# Patient Record
Sex: MALE | Race: WHITE | Employment: FULL TIME | URBAN - METROPOLITAN AREA
[De-identification: names, ages, dates, MRNs, and addresses within clinical notes are randomized per-mention and may not be internally consistent; named-entity substitution may affect disease eponyms.]

---

## 2020-07-11 ENCOUNTER — APPOINTMENT (OUTPATIENT)
Dept: GENERAL RADIOLOGY | Age: 25
End: 2020-07-11
Attending: PHYSICIAN ASSISTANT
Payer: OTHER GOVERNMENT

## 2020-07-11 ENCOUNTER — HOSPITAL ENCOUNTER (EMERGENCY)
Age: 25
Discharge: HOME | End: 2020-07-11
Attending: EMERGENCY MEDICINE
Payer: OTHER GOVERNMENT

## 2020-07-11 VITALS
WEIGHT: 190 LBS | HEIGHT: 69 IN | TEMPERATURE: 99.1 F | HEART RATE: 83 BPM | RESPIRATION RATE: 14 BRPM | DIASTOLIC BLOOD PRESSURE: 82 MMHG | OXYGEN SATURATION: 98 % | SYSTOLIC BLOOD PRESSURE: 142 MMHG | BODY MASS INDEX: 28.14 KG/M2

## 2020-07-11 DIAGNOSIS — G89.29 CHRONIC BILATERAL LOW BACK PAIN WITHOUT SCIATICA: Primary | ICD-10-CM

## 2020-07-11 DIAGNOSIS — M54.50 CHRONIC BILATERAL LOW BACK PAIN WITHOUT SCIATICA: Primary | ICD-10-CM

## 2020-07-11 PROCEDURE — 72072 X-RAY EXAM THORAC SPINE 3VWS: CPT

## 2020-07-11 PROCEDURE — 99282 EMERGENCY DEPT VISIT SF MDM: CPT

## 2020-07-11 PROCEDURE — 72110 X-RAY EXAM L-2 SPINE 4/>VWS: CPT

## 2020-07-11 NOTE — ED PROVIDER NOTES
EMERGENCY DEPARTMENT HISTORY AND PHYSICAL EXAM    Date: 7/11/2020  Patient Name: Liberty Moreno    History of Presenting Illness     Chief Complaint   Patient presents with    Back Pain         History Provided By: Patient        Additional History (Context): Liberty Moreno is a 26 y.o. male with No significant past medical history who presents with a request for x-ray of the lumbar and thoracic spine status post work injury which occurred December of last year. Patient states he was in the [employer] and was dangling from a line with a immediate stop causing him to have low back pain intermittently since this injury. Patient states initial injury was worked up with radiologic studies without acute findings. Patient denies numbness, weakness or paresthesias of the lower extremities. Patient also denies bowel or bladder dysfunction. Patient comes to the ER today with an order from Dr. Prerna Cutler for T-spine and L-spine x-rays. PCP: None        Past History     Past Medical History:  No past medical history on file. Past Surgical History:  No past surgical history on file. Family History:  No family history on file. Social History:  Social History     Tobacco Use    Smoking status: Not on file   Substance Use Topics    Alcohol use: Not on file    Drug use: Not on file       Allergies:  No Known Allergies      Review of Systems   Review of Systems  Review of Systems   Constitutional: Negative for fatigue and fever. HENT: Negative for congestion. Respiratory: Negative for cough and shortness of breath. Cardiovascular: Negative for chest pain. Gastrointestinal: Negative for abdominal pain, diarrhea, nausea and vomiting. Genitourinary: Negative for difficulty urinating and dysuria. Musculoskeletal: Intermittent lumbar pain without radicular symptoms x7 months. Skin: Negative for wound. Neurological: Negative for dizziness and headaches. All other systems reviewed and are negative. All Other Systems Negative  Physical Exam     Vitals:    07/11/20 1602   BP: 142/82   Pulse: 83   Resp: 14   Temp: 99.1 °F (37.3 °C)   SpO2: 98%   Weight: 86.2 kg (190 lb)   Height: 5' 9\" (1.753 m)     Physical Exam     Constitutional: Pt is oriented to person, place, and time. Pt appears well-developed and well-nourished. No acute distress. HENT:   Head: Normocephalic and atraumatic. Mouth/Throat: Oropharynx is clear and moist.   Eyes: Pupils are equal, round, and reactive to light. Neck: Normal range of motion. Neck supple. No tracheal deviation present. No thyromegaly present. Cardiovascular: Normal rate, regular rhythm and normal heart sounds. No murmur heard. Pulmonary/Chest: Effort normal and breath sounds normal. No respiratory distress. No wheezes or rales. Musculoskeletal: Normal range of motion. No edema or deformity. Vertebral spine is not TTP, there is no spasm present. Strength is 5/5 and equal DTRs are intact. Neurological: Pt is alert and oriented to person, place, and time   Skin: Skin is warm and dry. Psychiatric: Pt has a normal mood and affect;  behavior is normal. Judgment and thought content normal.           Diagnostic Study Results     Labs -   No results found for this or any previous visit (from the past 12 hour(s)). Radiologic Studies -   XR SPINE LUMB MIN 4 V    (Results Pending)   XR SPINE THORAC 3 V    (Results Pending)     CT Results  (Last 48 hours)    None        CXR Results  (Last 48 hours)    None            Medical Decision Making   I am the first provider for this patient. I reviewed the vital signs, available nursing notes, past medical history, past surgical history, family history and social history. Vital Signs-Reviewed the patient's vital signs.        Comparison:    Records Reviewed: Nursing Notes, Old Medical Records and Previous Radiology Studies    Procedures:  Procedures    Provider Notes (Medical Decision Making):   X-rays ordered for patient per his request.  Patient is neurologically intact. No acute distress. Concerning findings on x-ray patient discharge. Patient will follow-up with Dr. Delonte Simon. MED RECONCILIATION:  No current facility-administered medications for this encounter. No current outpatient medications on file. Disposition:  Home    DISCHARGE NOTE:     Pt has been reexamined. Patient has no new complaints, changes, or physical findings. Care plan outlined and precautions discussed. Results of imaging and exam were reviewed with the patient. All medications were reviewed with the patient; will d/c home with follow up. All of pt's questions and concerns were addressed. Patient was instructed and agrees to follow up with primary care, as well as to return to the ED upon further deterioration. Patient is ready to go home. Follow-up Information     Follow up With Specialties Details Why Francisco Javier 50    [address]  [address] [address]  [phone] [address] [facility] EMERGENCY DEPT Emergency Medicine  If symptoms worsen [address] [facility]  [phone]          There are no discharge medications for this patient. Diagnosis     Clinical Impression:   1.  Chronic bilateral low back pain without sciatica

## 2020-07-11 NOTE — DISCHARGE INSTRUCTIONS
Patient Education        Learning About Relief for Back Pain  What is back tension and strain? Back strain happens when you overstretch, or pull, a muscle in your back. You may hurt your back in an accident or when you exercise or lift something. Most back pain will get better with rest and time. You can take care of yourself at home to help your back heal.  What can you do first to relieve back pain? When you first feel back pain, try these steps:  · Walk. Take a short walk (10 to 20 minutes) on a level surface (no slopes, hills, or stairs) every 2 to 3 hours. Walk only distances you can manage without pain, especially leg pain. · Relax. Find a comfortable position for rest. Some people are comfortable on the floor or a medium-firm bed with a small pillow under their head and another under their knees. Some people prefer to lie on their side with a pillow between their knees. Don't stay in one position for too long. · Try heat or ice. Try using a heating pad on a low or medium setting, or take a warm shower, for 15 to 20 minutes every 2 to 3 hours. Or you can buy single-use heat wraps that last up to 8 hours. You can also try an ice pack for 10 to 15 minutes every 2 to 3 hours. You can use an ice pack or a bag of frozen vegetables wrapped in a thin towel. There is not strong evidence that either heat or ice will help, but you can try them to see if they help. You may also want to try switching between heat and cold. · Take pain medicine exactly as directed. ¨ If the doctor gave you a prescription medicine for pain, take it as prescribed. ¨ If you are not taking a prescription pain medicine, ask your doctor if you can take an over-the-counter medicine. What else can you do? · Stretch and exercise. Exercises that increase flexibility may relieve your pain and make it easier for your muscles to keep your spine in a good, neutral position. And don't forget to keep walking. · Do self-massage.  You can use self-massage to unwind after work or school or to energize yourself in the morning. You can easily massage your feet, hands, or neck. Self-massage works best if you are in comfortable clothes and are sitting or lying in a comfortable position. Use oil or lotion to massage bare skin. · Reduce stress. Back pain can lead to a vicious Hoh: Distress about the pain tenses the muscles in your back, which in turn causes more pain. Learn how to relax your mind and your muscles to lower your stress. Where can you learn more? Go to http://betty-peggy.info/. Enter L062 in the search box to learn more about \"Learning About Relief for Back Pain. \"  Current as of: March 21, 2017  Content Version: 11.5  © 9818-8890 Healthwise, Incorporated. Care instructions adapted under license by [employer] (which disclaims liability or warranty for this information). If you have questions about a medical condition or this instruction, always ask your healthcare professional. [address] any warranty or liability for your use of this information.

## 2020-07-11 NOTE — ED TRIAGE NOTES
Patient has an order for back x-ray. Because it is on paper we won't take it. Patient c/o back pain.

## 2020-07-20 ENCOUNTER — HOSPITAL ENCOUNTER (OUTPATIENT)
Dept: PHYSICAL THERAPY | Age: 25
Discharge: HOME | End: 2020-07-20
Payer: OTHER GOVERNMENT

## 2020-07-20 PROCEDURE — 97530 THERAPEUTIC ACTIVITIES: CPT

## 2020-07-20 PROCEDURE — 97161 PT EVAL LOW COMPLEX 20 MIN: CPT

## 2020-07-20 NOTE — PROGRESS NOTES
PT DAILY TREATMENT NOTE/LUMBAR EVAL 10-18    Patient Name: Kate Nolasco  Date:2020  : 1995  [x]  Patient  Verified  Payor: CHOLO / Plan: Wali Goss 74 / Product Type:  /    In time: 4:18  Out time: 4:56  Total Treatment Time (min): 38  Visit #: 1 of 12    Medicare/BCBS Only   Total Timed Codes (min):  23 1:1 Treatment Time:  38     Treatment Area: Low back pain [M54.5]  SUBJECTIVE  Pain Level (0-10 scale): 4/10  []constant []intermittent []improving []worsening []no change since onset    Any medication changes, allergies to medications, adverse drug reactions, diagnosis change, or new procedure performed?: [x] No    [] Yes (see summary sheet for update)  Subjective functional status/changes:     PLOF:  ind with all mobility, perspecta, sit for a long time due to job duty  Limitations to PLOF:   Mechanism of Injury:   Current symptoms/Complaints: MR. Kate Nolasco is a 25 y/o, M pt with CC of LBP, radiating pain ant thigh and Left hip socket. Imaging done including X-ray for l/s, showing negative. About 6 months ago; pt landed hard on his feet dropping from a pull up bar; he felt the pain along l/s but had to cont his physical training including plank and 2 mile rum. Pain and back stiffness has been aggravated with lifting and sitting without back. Previous Treatment/Compliance:   PMHx/Surgical Hx:  Work Hx:   Living Situation:   Pt Goals: \"no back pain\"  Barriers: []pain []financial []time []transportation []other  Motivation:   Substance use: []Alcohol []Tobacco []other:   FABQ Score: []low []elevate  Cognition: A & O x     Other:    OBJECTIVE/EXAMINATION  Domestic Life:  Activity/Recreational Limitations:   Mobility:   Self Care:          Modality rationale: decrease pain and increase tissue extensibility to improve the patients ability to tolerate ADLs   Min Type Additional Details    [] Estim:  []Unatt       []IFC  []Premod                        []Other:  []w/ice []w/heat  Position:  Location:    [] Estim: []Att    []TENS instruct  []NMES                    []Other:  []w/US   []w/ice   []w/heat  Position:  Location:    []  Traction: [] Cervical       []Lumbar                       [] Prone          []Supine                       []Intermittent   []Continuous Lbs:  [] before manual  [] after manual    []  Ultrasound: []Continuous   [] Pulsed                           []1MHz   []3MHz Location:  W/cm2:    []  Iontophoresis with dexamethasone         Location: [] Take home patch   [] In clinic   10 during TA []  Ice     [x]  heat  []  Ice massage  []  Laser   []  Anodyne Position: seated  Location: back    []  Laser with stim  []  Other: Position:  Location:    []  Vasopneumatic Device Pressure:       [] lo [] med [] hi   Temperature: [] lo [] med [] hi   [x] Skin assessment post-treatment:  [x]intact []redness- no adverse reaction    []redness  adverse reaction:     15 min [x]Eval                  []Re-Eval       23 min Therapeutic Activity:  []  See flow sheet :Pt edu within scope of practice on prognosis, POC, l/s anatomy, posture, lifting mechanics, modalities use, STM/DTM with foam roll, HEP review   Rationale: increase ROM, increase strength, improve coordination, improve balance and increase proprioception  to improve the patients ability to perform ADLs and job duties with ease     With   [] TE   [] TA   [] neuro   [] other: Patient Education: [x] Review HEP    [] Progressed/Changed HEP based on:   [] positioning   [] body mechanics   [] transfers   [] heat/ice application    [] other:      Other Objective/Functional Measures:     Physical Therapy Evaluation - Lumbar Spine ([facility])    SUBJECTIVE  Chief Complaint:    Mechanism of injury:    Symptoms:  Aggravated by:   [] Bending [x] Sitting [x] Standing [] Walking   [] Moving [] Cough [] Sneeze [] Valsalva   [] AM  [] PM  Lying:  [] sup   [] pro   [] sidelying   [] Other:     Eased by:    [] Bending [] Sitting [] Standing [] Walking   [] Moving [] AM  [] PM  Lying: [] sup  [] pro  [] sidelying   [] Other:     General Health:  Red Flags Indicated? [] Yes    [] No  [] Yes [] No Recent weight change (If yes, due to dieting?  [] Yes  [] No)   [] Yes [] No Weakness in legs during walking  [] Yes [] No Unremitting pain at night  [] Yes [] No Abdominal pain or problems  [] Yes [] No Rectal bleeding  [] Yes [] No Feet more cold or painful in cold weather  [] Yes [] No Menstrual irregularities  [] Yes [] No Blood or pain with urination  [] Yes [] No Dysfunction of bowel or bladder  [] Yes [] No Recent illness within past 3 weeks (i.e, cold, flu)  [] Yes [x] No Numbness/tingling in buttock/genitalia region    Past History/Treatments:     Diagnostic Tests: [] Lab work [x] X-rays    [] CT [] MRI     [] Other:  Results: negative for both t/s and l/s    Functional Status  Prior level of function:  Present functional limitations:  What position do you sleep in?:    OBJECTIVE  Posture:  Lateral Shift: [] R    [] L     [] +  [] -  Kyphosis: [] Increased [] Decreased   []  WNL  Lordosis:  [] Increased [x] Decreased   [] WNL  Pelvic symmetry: [] WNL    [] Other:    Gait:  [] Normal     [] Abnormal:    Active Movements: [] N/A   [] Too acute   [x] Other: WNL, pain with flex and ext  ROM % AROM % PROM Comments:pain, area   Forward flexion 40-60      Extension 20-30      SB right 20-30      SB left 20-30      Rotation right 5-10      Rotation left 5-10        Repeated Movements   Effects on present pain: produces (IA), abolishes (A), increases (incr), decreases (decr), centralizes (C), peripheral (PH), no effect (NE)   Pre-Test Sx Flexion Repeated Flexion Extension Repeated Extension Repeated SBL Repeated SBR   Sitting          Standing   PH to Left side       Lying      N/A N/A   Comments:  Side Glide:  Sustained passive positioning test:  REJI: increased pain with back and radiating pain along B LEs    Neuro Screen [] WNL  Myotome/Dermatome/Reflexes:  Comments:    Dural Mobility:  SLR Sitting: [] R    [] L    [] +    [] -  @ (degrees):           Supine: [] R    [] L    [] +    [] -  @ (degrees):   Slump Test: [] R    [] L    [] +    [] -  @ (degrees):   Prone Knee Bend: [] R    [] L    [] +    [] -     Palpation  [] Min  [] Mod  [] Severe    Location:  [] Min  [] Mod  [] Severe    Location:  [] Min  [] Mod  [] Severe    Location:    Strength   L(0-5) R (0-5) N/T   Hip Flexion (L1,2) 4+ 4+ []   Knee Extension (L3,4) 5 5 []   Ankle Dorsiflexion (L4) 5 5 []   Great Toe Extension (L5) 5 5 []   Ankle Plantarflexion (S1) ~4 ~4 []   Knee Flexion (S1,2) 5 5 []   Upper Abdominals   []   Lower Abdominals   []   Paraspinals   []   Back Rotators   []   Gluteus Blaine 4 4 []   Gluteus Med 3 3 []     Special Tests  Lumbar:  Lumb. Compression: [] Pos  [] Neg               Lumbar Distraction:   [] Pos  [] Neg    Quadrant:  [] Pos  [] Neg   [] Flex  [] Ext    Sacroilliac:  Gaenslen's: [] R    [] L    [] +    [] -     Compression: [] +    [] -     Gapping:  [] +    [] -     Thigh Thrust: [] R    [] L    [] +    [] -     Leg Length: [] +    [] -   Position:    Crests:    ASIS:    PSIS:    Sacral Sulcus:    Mobility: Standing flex:     Sitting flex:     Supine to sit:     Prone knee bend:         Hip: So Nayely:  [] R    [] L    [] +    [] -     Scour:  [] R    [x] L    [] +    [x] -     Piriformis: [x] R    [x] L    [x] +    [] -          Deficits: Vj's: [] R    [] L    [] +    [] -     Mayco: [] R    [] L    [] +    [] -     Hamstrings 90/90: mod tightness B    Gastrocsoleus (to neutral): Right: Left:       Global Muscular Weakness:  Abdominals:  Quadratus Lumborum:  Paraspinals:   Other:    Other tests/comments:       Pain Level (0-10 scale) post treatment: 0/10    ASSESSMENT/Changes in Function: see POC    Patient will continue to benefit from skilled PT services to modify and progress therapeutic interventions, address functional mobility deficits, address ROM deficits, address strength deficits, analyze and address soft tissue restrictions, analyze and cue movement patterns, analyze and modify body mechanics/ergonomics, assess and modify postural abnormalities and instruct in home and community integration to attain remaining goals.      [x]  See Plan of Care  []  See progress note/recertification  []  See Discharge Summary         Progress towards goals / Updated goals:  See POC    PLAN  [x]  Upgrade activities as tolerated     [x]  Continue plan of care  []  Update interventions per flow sheet       []  Discharge due to:_  []  Other:_    Edgar Jones, PT 7/20/2020  4:19 PM

## 2020-07-20 NOTE — PROGRESS NOTES
[facility] [employer] [facility]  [address] [place] [address]  [phone] fax    Plan of Care/ Statement of Necessity for Physical Therapy Services    Patient name: Sheyla Kinney Start of Care: 2020   Referral source: Saud Chow MD : 1995    Medical Diagnosis: Low back pain [M54.5]  Payor:  / Plan: Wali Goss 74 / Product Type:  /  Onset Date:about 6 months ago    Treatment Diagnosis: Back pain, radicular pain with B ant thigh   Prior Hospitalization: see medical history Provider#: [id number]   Medications: Verified on Patient summary List    Comorbidities: alcohol and tobacco use   Prior Level of Function: ind with all mobility, perspecta, sit for a long time due to job duty     The [address] [place] [address] and following information is based on the information from the initial evaluation. Assessment/ watkins information: Davina Sheikh is a 25 y/o, M pt with CC of LBP, radiating pain ant thigh and Left hip socket. Imaging done including X-ray for l/s, showing negative. About 6 months ago; pt landed hard on his feet dropping from a pull up bar; he felt the pain along l/s but had to cont his physical training including plank and 2 mile rum. Pain and back stiffness has been aggravated with lifting and sitting without back. Pt present with Left rotated l/s, mod pain with compression, poor core strength and glute med strength. Mukesh Medley also noted poor flexibility of HS and hip ER. WNL with dermatomes and myotomes screening. Pt would benefit from skilled PT to address these deficits above for pain free functional mobility.     Evaluation Complexity History MEDIUM  Complexity : 1-2 comorbidities / personal factors will impact the outcome/ POC ; Examination MEDIUM Complexity : 3 Standardized tests and measures addressing body structure, function, activity limitation and / or participation in recreation  ;Presentation LOW Complexity : Stable, uncomplicated  ;Clinical Decision Making MEDIUM Complexity : FOTO score of 26-74  Overall Complexity Rating: LOW   Problem List: pain affecting function, decrease strength, edema affecting function, impaired gait/ balance, decrease ADL/ functional abilitiies, decrease activity tolerance, decrease flexibility/ joint mobility and decrease transfer abilities   Treatment Plan may include any combination of the following: Therapeutic exercise, Therapeutic activities, Neuromuscular re-education, Physical agent/modality, Gait/balance training, Manual therapy, Patient education, Self Care training, Functional mobility training, Home safety training and Stair training  Patient / Family readiness to learn indicated by: asking questions, trying to perform skills and interest  Persons(s) to be included in education: patient (P)  Barriers to Learning/Limitations: None  Patient Goal (s): no back pain  Patient Self Reported Health Status: fair  Rehabilitation Potential: good    Short term goals: To be accomplished within 1 week  1. Pt will be independent with HEP to maintain progression. Eval status: given and reviewed HEP     Long term goals: To be accomplished within 4 weeks  1. Pt will improve FOTO score by 16 points to 76/100 to show improvement with functional mobility performance. Eval status: 60                2. Pt will report no more than 0-1/10 to improve QOL. Eval status: 2-8/10                3. Pt will be able to sit for more than 30 min with no increased of pain so he can tolerate job duties. Eval status: required back support, mod-mac pain and stiffness                4. Pt will demonstrate good form with plank and Oov indicating improvement of core strength and coordination for safe and comfortable ADLs/job duties performance. Eval status: poor TA draw    Frequency / Duration: Patient to be seen 2-3 times per week for 4 weeks.     Patient/ CarPatient/ Caregiver education and instruction: Diagnosis, prognosis, self care, activity modification, brace/ splint application and exercises   [x]  Plan of care has been reviewed with ARANZA Singleton,PT 7/20/2020 5:59 PM    ________________________________________________________________________    I certify that the above Therapy Services are being furnished while the patient is under my care. I agree with the treatment plan and certify that this therapy is necessary.     Physician's Signature:____________Date:_________TIME:________    ** Signature, Date and Time must be completed for valid certification **    Please sign and return to [facility]  [address] [place] [address]  [phone] fax

## 2020-07-24 ENCOUNTER — HOSPITAL ENCOUNTER (OUTPATIENT)
Dept: PHYSICAL THERAPY | Age: 25
Discharge: HOME | End: 2020-07-24
Payer: OTHER GOVERNMENT

## 2020-07-24 PROCEDURE — 97110 THERAPEUTIC EXERCISES: CPT

## 2020-07-24 PROCEDURE — 97140 MANUAL THERAPY 1/> REGIONS: CPT

## 2020-07-24 NOTE — PROGRESS NOTES
PT DAILY TREATMENT NOTE 10-18    Patient Name: Immanuel Ramon  Date:2020  : 1995  [x]  Patient  Verified  Payor: [employer] / Plan: Anitra Mari / Product Type:  /    In time: 4:12  Out time: 4:50  Total Treatment Time (min): 38  Visit #: 2 of 12    Treatment Area: Low back pain [M54.5]    SUBJECTIVE  Pain Level (0-10 scale): 2-310  Any medication changes, allergies to medications, adverse drug reactions, diagnosis change, or new procedure performed?: [x] No    [] Yes (see summary sheet for update)  Subjective functional status/changes:   [] No changes reported  Pt. Reports he is feeling about the same today. He has been doing some stretches at home, but not all of them. OBJECTIVE    28 min Therapeutic Exercise:  [x] See flow sheet :   Rationale: increase ROM and increase strength to improve the patients ability to increase ease of ADLs    10 min Manual Therapy:  Trigger point release and STM to lumbar paraspinals   Rationale: decrease pain, increase ROM and increase tissue extensibility to increase ease of ambulation           With   [x] TE   [] TA   [] neuro   [] other: Patient Education: [x] Review HEP    [] Progressed/Changed HEP based on:   [] positioning   [] body mechanics   [] transfers   [] heat/ice application    [] other:      Other Objective/Functional Measures:   Pt. Tolerated PT well but had mild increase in pain with chops in tall kneeling   He was challenged with oov horizontal abduction and pallof press  Unable to perform bird dog exercise appropriately so performed LE lift instead    Pain Level (0-10 scale) post treatment:  4/10    ASSESSMENT/Changes in Function:  Pt. Initiated PT and is semi-compliant with his HEP. He continues to have decreased core stability and decreased hip flexor flexibility. He was educated on self massage with ball to help reduce trigger points and tightness along lumbar paraspinals.      Patient will continue to benefit from skilled PT services to modify and progress therapeutic interventions, address functional mobility deficits, address ROM deficits, address strength deficits, analyze and address soft tissue restrictions, analyze and cue movement patterns and analyze and modify body mechanics/ergonomics to attain remaining goals. Progress towards goals / Updated goals:  Short term goals: To be accomplished within 1 week  1. Pt will be independent with HEP to maintain progression. Eval status: given and reviewed HEP  Progressing: semi-compliant (7/24/20)     Long term goals: To be accomplished within 4 weeks  1. Pt will improve FOTO score by 16 points to 76/100 to show improvement with functional mobility performance. Eval status: 60                2. Pt will report no more than 0-1/10 to improve QOL. Eval status: 2-8/10                3. Pt will be able to sit for more than 30 min with no increased of pain so he can tolerate job duties. Eval status: required back support, mod-mac pain and stiffness                4. Pt will demonstrate good form with plank and Oov indicating improvement of core strength and coordination for safe and comfortable ADLs/job duties performance.   Eval status: poor TA draw    PLAN  []  Upgrade activities as tolerated     [x]  Continue plan of care  []  Update interventions per flow sheet       []  Discharge due to:_  []  Other:_      Bertha Guerrero PT 7/24/2020  7:51 AM    Future Appointments   Date Time Provider Jerrod Clements   7/24/2020  4:15 PM Adelso Olguin PT [facility]   7/29/2020  5:00 PM Adelso Olguin PT [facility]   7/31/2020  5:00 PM Adelso Olguin PT [facility]

## 2020-08-12 ENCOUNTER — HOSPITAL ENCOUNTER (OUTPATIENT)
Dept: PHYSICAL THERAPY | Age: 25
Discharge: HOME | End: 2020-08-12
Payer: OTHER GOVERNMENT

## 2020-08-12 PROCEDURE — 97110 THERAPEUTIC EXERCISES: CPT

## 2020-08-12 PROCEDURE — 97140 MANUAL THERAPY 1/> REGIONS: CPT

## 2020-08-12 PROCEDURE — 97014 ELECTRIC STIMULATION THERAPY: CPT

## 2020-08-12 NOTE — PROGRESS NOTES
PT DAILY TREATMENT NOTE 10-18    Patient Name: Tsering Hardy  Date:2020  : 1995  [x]  Patient  Verified  Payor:  / Plan: [facility] REGION / Product Type:  /    In time:430  Out time:510  Total Treatment Time (min): 40  Visit #: 3 of 12        Treatment Area: Low back pain [M54.5]    SUBJECTIVE  Pain Level (0-10 scale): 6  Any medication changes, allergies to medications, adverse drug reactions, diagnosis change, or new procedure performed?: [x] No    [] Yes (see summary sheet for update)  Subjective functional status/changes:   [] No changes reported  Patient reported greatly increased LBP upon arrival    OBJECTIVE    Modality rationale: decrease pain to improve the patients ability to perform ADLs   Min Type Additional Details   10 [x] Estim:  [x]Unatt       [x]IFC  []Premod                        []Other:  []w/ice   [x]w/heat  Position:seated  Location:l/s    [] Estim: []Att    []TENS instruct  []NMES                    []Other:  []w/US   []w/ice   []w/heat  Position:  Location:    []  Traction: [] Cervical       []Lumbar                       [] Prone          []Supine                       []Intermittent   []Continuous Lbs:  [] before manual  [] after manual    []  Ultrasound: []Continuous   [] Pulsed                           []1MHz   []3MHz W/cm2:  Location:    []  Iontophoresis with dexamethasone         Location: [] Take home patch   [] In clinic    []  Ice     []  heat  []  Ice massage  []  Laser   []  Anodyne Position:  Location:    []  Laser with stim  []  Other:  Position:  Location:    []  Vasopneumatic Device Pressure:       [] lo [] med [] hi   Temperature: [] lo [] med [] hi   [] Skin assessment post-treatment:  []intact []redness- no adverse reaction    []redness  adverse reaction:         20 min Therapeutic Exercise:  [x] See flow sheet :   Rationale: increase ROM, increase strength and increase proprioception to improve the patients ability to perform ADLs      10 min Manual Therapy:  TPR B l/s paraspinals and right piriformis   Rationale: increase ROM, increase tissue extensibility and decrease trigger points to perform ADLs          With   [] TE   [] TA   [] neuro   [] other: Patient Education: [x] Review HEP    [] Progressed/Changed HEP based on:   [] positioning   [] body mechanics   [] transfers   [] heat/ice application    [] other:      Other Objective/Functional Measures: held therex per flow sheet due to increased LBP.  trialed IFC for pain relief to allow patient to perform additional activity today. Patient able to perform stretching exercises and mild core strengthening today. Pain Level (0-10 scale) post treatment: 3    ASSESSMENT/Changes in Function: Patient presented with increased pain and mm tightness B l/s paraspinals today. Utilized IFC for pain relief to allow patient to perform as much of program as able with good results. Patient able to do stretches and mild core strengthening but required frequent cuing to engage abdominals throughout treatment in multiple positions. Patient will continue to benefit from skilled PT services to modify and progress therapeutic interventions, address functional mobility deficits, address ROM deficits, address strength deficits, analyze and address soft tissue restrictions and analyze and cue movement patterns to attain remaining goals. [x]  See Plan of Care  []  See progress note/recertification  []  See Discharge Summary         Progress towards goals / Updated goals:  Short term goals: To be accomplished within 1 week  1. Pt will be independent with HEP to maintain progression. Eval status: given and reviewed HEP  Progressing: semi-compliant (7/24/20)     Long term goals: To be accomplished within 4 weeks  1. Pt will improve FOTO score by 16 points to 76/100 to show improvement with functional mobility performance. Eval status: 60                2.  Pt will report no more than 0-1/10 to improve QOL.  Eval status: 2-8/10                3. Pt will be able to sit for more than 30 min with no increased of pain so he can tolerate job duties. Eval status: required back support, mod-mac pain and stiffness                4. Pt will demonstrate good form with plank and Oov indicating improvement of core strength and coordination for safe and comfortable ADLs/job duties performance.   Eval status: poor TA draw       PLAN  []  Upgrade activities as tolerated     [x]  Continue plan of care  []  Update interventions per flow sheet       []  Discharge due to:_  []  Other:_      Vergia Spurling, PTA 8/12/2020  5:21 PM    Future Appointments   Date Time Provider Jerrod Clements   8/14/2020  4:30 PM [facility]   8/19/2020  4:30 PM Bonnielee Render, PT [facility]   8/21/2020  4:30 PM Bonnielee Render, PT [facility]   8/26/2020  4:30 PM Bonnielee Render, PT [facility]   8/28/2020  4:30 PM Bonnielee Render, PT [facility]

## 2020-08-14 ENCOUNTER — HOSPITAL ENCOUNTER (OUTPATIENT)
Dept: PHYSICAL THERAPY | Age: 25
Discharge: HOME | End: 2020-08-14
Payer: OTHER GOVERNMENT

## 2020-08-14 PROCEDURE — 97110 THERAPEUTIC EXERCISES: CPT

## 2020-08-14 PROCEDURE — 97112 NEUROMUSCULAR REEDUCATION: CPT

## 2020-08-14 NOTE — PROGRESS NOTES
PT DAILY TREATMENT NOTE 10-18    Patient Name: Mary Esteban  Date:2020  : 1995  [x]  Patient  Verified  Payor:  / Plan: [facility]  [facility]ION / Product Type:  /    In time: 4:30  Out time: 5:12  Total Treatment Time (min): 42  Visit #: 4 of 12    Treatment Area: Low back pain [M54.5]    SUBJECTIVE  Pain Level (0-10 scale): 2/10  Any medication changes, allergies to medications, adverse drug reactions, diagnosis change, or new procedure performed?: [x] No    [] Yes (see summary sheet for update)  Subjective functional status/changes:   [] No changes reported  Pt reported that he has been doing okay. He reports that his back was hurting after the last session, but mentioned that he the Estim at the end of the session made his back feel better. He reports taking his pain meds prior to coming to therapy. OBJECTIVE    23 min Therapeutic Exercise:  [x] See flow sheet :   Rationale: increase ROM and increase strength to improve the patients ability to increase ease of ADLs     19 min Neuromuscular Re-education:  [x]  See flow sheet : PPT biofeedback with single leg extension, standing balance exercises   Rationale: improve coordination and increase proprioception  to improve the patients ability to improve ease of prolonged sitting.           With   [x] TE   [] TA   [] neuro   [] other: Patient Education: [x] Review HEP    [] Progressed/Changed HEP based on:   [] positioning   [] body mechanics   [] transfers   [] heat/ice application    [] other:      Other Objective/Functional Measures:   Pt tolerated exercises well  Pt has excessive anterior tilt with push ups, initiated biofeedback with PPT  Educated on prone quadriceps stretch to decrease ant thigh tightness  No increased pain with exercises  Challenged with SLS on right  Challenged with squats and Lunges with BOSU  Denied ICF due to decreased pain       Pain Level (0-10 scale) post treatment: 0/10    ASSESSMENT/Changes in Function:  Pt is slowly progressing towards his goals. He continues to demonstrate decreased core stability and excessive anterior tilt with bird dog and push ups. He responded well to exercise progressions and standing balance training. Pt also demonstrated improved form with PPT and biofeedback. He c/o quadriceps tightness which improved with prone quadriceps stretch. Patient will continue to benefit from skilled PT services to modify and progress therapeutic interventions, address functional mobility deficits, address ROM deficits, address strength deficits, analyze and address soft tissue restrictions, analyze and cue movement patterns, analyze and modify body mechanics/ergonomics, assess and modify postural abnormalities and address imbalance/dizziness to attain remaining goals. Progress towards goals / Updated goals:  Short term goals: To be accomplished within 1 week  1. Pt will be independent with HEP to maintain progression. Eval status: given and reviewed HEP  Progressing: semi-compliant (7/24/20)     Long term goals: To be accomplished within 4 weeks  1. Pt will improve FOTO score by 16 points to 76/100 to show improvement with functional mobility performance. Eval status: 60                2. Pt will report no more than 0-1/10 to improve QOL. Eval status: 2-8/10  Not met: continues to have increased pain (8/14/20)                3. Pt will be able to sit for more than 30 min with no increased of pain so he can tolerate job duties. Eval status: required back support, mod-mac pain and stiffness                4. Pt will demonstrate good form with plank and Oov indicating improvement of core strength and coordination for safe and comfortable ADLs/job duties performance.   Eval status: poor TA draw       PLAN  [x]  Upgrade activities as tolerated     [x]  Continue plan of care  []  Update interventions per flow sheet       []  Discharge due to:_  []  Other:_      [facility] DE MALIK [facility] [place] [facility] 8/14/2020  5:05 PM  I was present during the entire treatment, directing and participating in the treatment.    Peyton Smith DPT      Future Appointments   Date Time Provider Jerrod Clements   8/19/2020  4:30 PM [facility]   8/21/2020  4:30 PM Judithe Batters, PT [facility]   8/26/2020  4:30 PM Judithe Batters, PT [id number]   8/28/2020  4:30 PM Judithe Batters, PT [facility]

## 2020-08-21 ENCOUNTER — HOSPITAL ENCOUNTER (OUTPATIENT)
Dept: PHYSICAL THERAPY | Age: 25
Discharge: HOME | End: 2020-08-21
Payer: OTHER GOVERNMENT

## 2020-08-21 PROCEDURE — 97112 NEUROMUSCULAR REEDUCATION: CPT

## 2020-08-21 PROCEDURE — 97110 THERAPEUTIC EXERCISES: CPT

## 2020-08-21 NOTE — PROGRESS NOTES
PT DAILY TREATMENT NOTE 10-18    Patient Name: Linsey Nye  Date:2020  : 1995  [x]  Patient  Verified  Payor:  / Plan: [facility]  [facility] REGION / Product Type:  /    In time:4:30  Out time: 5:05  Total Treatment Time (min): 35  Visit #: 5 of 12    Treatment Area: Low back pain [M54.5]    SUBJECTIVE  Pain Level (0-10 scale): 3/10  Any medication changes, allergies to medications, adverse drug reactions, diagnosis change, or new procedure performed?: [x] No    [] Yes (see summary sheet for update)  Subjective functional status/changes:   [] No changes reported  \"I feel exponentially better than I did yesterday and the last visit. \"    OBJECTIVE    25 min Therapeutic Exercise:  [] See flow sheet :   Rationale: increase ROM and increase strength to improve the patients ability to perform ADLs with ease    10 min Neuromuscular Re-education:  []  See flow sheet :   Rationale: improve coordination, improve balance and increase proprioception  to improve the patients ability to stabilize, use proper body mechanics, and promote proper postural awareness         With   [x] TE   [] TA   [x] neuro   [] other: Patient Education: [x] Review HEP    [] Progressed/Changed HEP based on:   [x] positioning   [x] body mechanics   [] transfers   [] heat/ice application    [] other:      Other Objective/Functional Measures:   Dead bug in place of bird dog to encourage more posterior tilt    Pain Level (0-10 scale) post treatment: 3    ASSESSMENT/Changes in Function: Patient demonstrates difficulty with dissociating pelvis and spine with PPT exercises, excessive anterior tilt. He also presents with bilateral ankle weakness and would benefit from general LE strengthening.       Patient will continue to benefit from skilled PT services to modify and progress therapeutic interventions, address functional mobility deficits, address ROM deficits, address strength deficits, analyze and address soft tissue restrictions, analyze and cue movement patterns, analyze and modify body mechanics/ergonomics and assess and modify postural abnormalities to attain remaining goals. []  See Plan of Care  []  See progress note/recertification  []  See Discharge Summary         Progress towards goals / Updated goals:  Short term goals: To be accomplished within 1 week  1. Pt will be independent with HEP to maintain progression. Eval status: given and reviewed HEP  Progressing: semi-compliant (7/24/20)     Long term goals: To be accomplished within 4 weeks  1. Pt will improve FOTO score by 16 points to 76/100 to show improvement with functional mobility performance. Eval status: 60                2. Pt will report no more than 0-1/10 to improve QOL. Eval status: 2-8/10  Not met: continues to have increased pain (8/14/20)                3. Pt will be able to sit for more than 30 min with no increased of pain so he can tolerate job duties. Eval status: required back support, mod-mac pain and stiffness  Progressing: depends on if there is lumbar                 4. Pt will demonstrate good form with plank and Oov indicating improvement of core strength and coordination for safe and comfortable ADLs/job duties performance.   Eval status: poor TA draw    PLAN  [x]  Upgrade activities as tolerated     [x]  Continue plan of care  []  Update interventions per flow sheet       []  Discharge due to:_  []  Other:_      PAMELA Castro 8/21/2020  4:46 PM    Future Appointments   Date Time Provider Jerrod Clements   8/26/2020  4[facility]30 [facility]   8/28/2020  4:30 PM [facility]

## 2020-08-28 ENCOUNTER — HOSPITAL ENCOUNTER (OUTPATIENT)
Dept: PHYSICAL THERAPY | Age: 25
Discharge: HOME | End: 2020-08-28
Payer: OTHER GOVERNMENT

## 2020-08-28 PROCEDURE — 97110 THERAPEUTIC EXERCISES: CPT

## 2020-08-28 NOTE — PROGRESS NOTES
PT DAILY TREATMENT NOTE 10-18    Patient Name: Tsering Hardy  Date:2020  : 1995  [x]  Patient  Verified  Payor:  / Plan: [employer] [address] / Product Type:  /    In time: 4:35  Out time: 5:13  Total Treatment Time (min): 38  Visit #: 6 of 12    Treatment Area: Low back pain [M54.5]    SUBJECTIVE  Pain Level (0-10 scale):  4/10  Any medication changes, allergies to medications, adverse drug reactions, diagnosis change, or new procedure performed?: [x] No    [] Yes (see summary sheet for update)  Subjective functional status/changes:   [] No changes reported  Pt. Reports he is feeling sore today. He reports he continues to have increased pain with sitting at work. OBJECTIVE    38 min Therapeutic Exercise:  [x] See flow sheet :   Rationale: increase ROM and increase strength to improve the patients ability to increase ease of ADLs          With   [x] TE   [] TA   [] neuro   [] other: Patient Education: [x] Review HEP    [] Progressed/Changed HEP based on:   [] positioning   [] body mechanics   [] transfers   [] heat/ice application    [] other:      Other Objective/Functional Measures:    FOTO: 54 points  Sitting tolerance: 15 minutes       Pain Level (0-10 scale) post treatment:  4/10    ASSESSMENT/Changes in Function:      []  See Plan of Care  [x]  See progress note/recertification  []  See Discharge Summary         Progress towards goals / Updated goals:  See progress note    PLAN  []  Upgrade activities as tolerated     [x]  Continue plan of care  []  Update interventions per flow sheet       []  Discharge due to:_  []  Other:_      Pura Rios, PT 2020  8:04 AM    Future Appointments   Date Time Provider Jerrod Clements   2020  4:30 PM Angel Solis, PT [facility]

## 2020-08-28 NOTE — PROGRESS NOTES
[facility] [address]  [phone] fax    Physical Therapy Progress Note  Patient name: Shree Orozco Start of Care: 2020   Referral source: Sharron Cushing, MD : 1995                Medical Diagnosis: Low back pain [M54.5]  Payor:  / Plan: [employer] / Product Type:  /  Onset Date:about 6 months ago                Treatment Diagnosis: Back pain, radicular pain with B ant thigh   Prior Hospitalization: see medical history Provider#: [id number]   Medications: Verified on Patient summary List    Comorbidities: alcohol and tobacco use   Prior Level of Function: ind with all mobility, perspecta, sit for a long time due to job duty                Visits from [facility]: 6    Missed Visits: 2    Established Goals:         Excellent           Good         Limited           None  [x] Increased ROM   []  []  [x]  []  [x] Increased Strength  []  []  [x]  []  [x] Increased Mobility  []  []  [x]  []   [x] Decreased Pain   []  []  [x]  []  [] Decreased Swelling  []  []  []  []    Key Functional Changes: pt. Is making limited progress towards goals. FOTO score decreased to 54 points. He continues to have poor sitting tolerance at about 15 minutes and increased pain with working. He continues to have difficulty dissociating pelvis and spine movements and has excessive anterior pelvic tilt with planks. He continues to demonstrate decreased core stability. Skilled PT is medically necessary in order to improve strength and flexibility for increased ease of ADLs and improved quality of life. Updated Goals: to be achieved in 4 weeks:  1. Patient will improve FOTO score by 16 points in order to demonstrate a significant improvement in function. 2. Patient will report pain at 2/10 or less during work in order to improve quality of life.    3. Patient will improve sitting tolerance to 30 minutes in order to increase ease of ADLs. 4. Patient will perform plank for 30 seconds with good form in order to demonstrate improving core stability for increased ease of working. ASSESSMENT/RECOMMENDATIONS:  [x]Continue therapy per initial plan/protocol at a frequency of  2 x per week for 4 weeks  []Continue therapy with the following recommended changes:_____________________      _____________________________________________________________________  []Discontinue therapy progressing towards or have reached established goals  []Discontinue therapy due to lack of appreciable progress towards goals  []Discontinue therapy due to lack of attendance or compliance  []Await Physician's recommendations/decisions regarding therapy  []Other:________________________________________________________________    Thank you for this referral.   Howard Melgar, PT 8/28/2020 5:18 PM    NOTE TO PHYSICIAN:  [address]   FAX TO [facility]: [phone]  If you are unable to process this request in 24 hours please contact our office: [phone] I have read the above report and request that my patient continue as recommended. ? I have read the above report and request that my patient continue therapy with the following changes/special instructions:____________________________________  ? I have read the above report and request that my patient be discharged from therapy.     Physicians signature: ______________________________Date: ______Time:______

## 2020-09-02 ENCOUNTER — HOSPITAL ENCOUNTER (OUTPATIENT)
Dept: PHYSICAL THERAPY | Age: 25
Discharge: HOME | End: 2020-09-02
Payer: OTHER GOVERNMENT

## 2020-09-02 NOTE — PROGRESS NOTES
PT DAILY TREATMENT NOTE 10-18    Patient Name: Emanuel Welch  Date:2020  : 1995  [x]  Patient  Verified  Payor: [employer] / Plan: Wali Goss 74 / Product Type:  /    In time:1:30  Out time:2:04  Total Treatment Time (min): 34  Visit #: 1 of 8    Treatment Area: Low back pain [M54.5]    SUBJECTIVE  Pain Level (0-10 scale): 2  Any medication changes, allergies to medications, adverse drug reactions, diagnosis change, or new procedure performed?: [x] No    [] Yes (see summary sheet for update)  Subjective functional status/changes:   [] No changes reported  \"I am going to need time to take a shower before going back to work today. \"    OBJECTIVE    14 min Therapeutic Exercise:  [x] See flow sheet :   Rationale: increase ROM and increase strength to improve the patients ability to perform ADLs with ease    20 min Neuromuscular Re-education:  [x]  See flow sheet :   Rationale: improve coordination, improve balance and increase proprioception  to improve the patients ability to stabilize, use proper body mechanics, and promote proper postural awareness         With   [x] TE   [] TA   [x] neuro   [] other: Patient Education: [x] Review HEP    [] Progressed/Changed HEP based on:   [x] positioning   [x] body mechanics   [] transfers   [] heat/ice application    [] other:      Other Objective/Functional Measures:   Continued with updated POC from previous session     Pain Level (0-10 scale) post treatment: 3/10 hip    ASSESSMENT/Changes in Function: Patient arrives with decreased amount of pain than typical; some increase post treatment from activity. He continues to demonstrate decreased core stability, however putting forth more effort with PPT to illicit cuff feedback. Gaining muscle memory for the ability to carryover techniques in his daily activity will benefit pt.     Patient will continue to benefit from skilled PT services to modify and progress therapeutic interventions, address functional mobility deficits, address ROM deficits, address strength deficits, analyze and address soft tissue restrictions, analyze and cue movement patterns, analyze and modify body mechanics/ergonomics and assess and modify postural abnormalities to attain remaining goals. []  See Plan of Care  [x]  See progress note/recertification  []  See Discharge Summary         Progress towards goals / Updated goals:  1. Patient will improve FOTO score by 16 points in order to demonstrate a significant improvement in function. 2. Patient will report pain at 2/10 or less during work in order to improve quality of life. 3. Patient will improve sitting tolerance to 30 minutes in order to increase ease of ADLs.    4. Patient will perform plank for 30 seconds with good form in order to demonstrate improving core stability for increased ease of working.        PLAN  [x]  Upgrade activities as tolerated     [x]  Continue plan of care  [x]  Update interventions per flow sheet       []  Discharge due to:_  []  Other:_      PAMELA Warren 9/2/2020  2:09 PM    Future Appointments   Date Time Provider Jerrod Clements   9/4/2020  3:45 PM Robb Iba, PT MMCPTPB [facility]   9/9/2020  4:30 PM Clyde Bhatt PTA [facility]   9/11/2020  4:30 PM Gloria Obrien NVKKGLW [facility]   9/16/2020  4:30 PM Gloria Obrien IKEJUAD [facility]   9/18/2020  3:45 PM Robb Iba, PT [facility]   9/23/2020  4:30 PM Gloria Obrien UGOQLSJ [facility]   9/25/2020  4:30 PM Robb Iba, PT [facility]   9/30/2020  4:30 PM Gloria Obrien VJCEBKY [facility]   10/2/2020  4:30 PM Robb Iba, PT [facility]

## 2020-09-04 ENCOUNTER — HOSPITAL ENCOUNTER (OUTPATIENT)
Dept: PHYSICAL THERAPY | Age: 25
Discharge: HOME | End: 2020-09-04
Payer: OTHER GOVERNMENT

## 2020-09-09 ENCOUNTER — HOSPITAL ENCOUNTER (OUTPATIENT)
Dept: PHYSICAL THERAPY | Age: 25
Discharge: HOME | End: 2020-09-09
Payer: OTHER GOVERNMENT

## 2020-09-09 PROCEDURE — 97110 THERAPEUTIC EXERCISES: CPT

## 2020-09-09 PROCEDURE — 97112 NEUROMUSCULAR REEDUCATION: CPT

## 2020-09-09 NOTE — PROGRESS NOTES
PT DAILY TREATMENT NOTE 10-18    Patient Name: Liberty Moreno  Date:2020  : 1995  [x]  Patient  Verified  Payor:  / Plan: [facility]  [facility] REGION / Product Type:  /    In time:430  Out time:501  Total Treatment Time (min): 31  Visit #: 2 of 8    Treatment Area: Low back pain [M54.5]    SUBJECTIVE  Pain Level (0-10 scale): 310  Any medication changes, allergies to medications, adverse drug reactions, diagnosis change, or new procedure performed?: [x] No    [] Yes (see summary sheet for update)  Subjective functional status/changes:   [] No changes reported  Pt stated that his pain is never ending. Reported that his pain is always less after therapy, but returns the next day due to a stiff mattress and work duties    OBJECTIVE    9 min Therapeutic Exercise:  [x] See flow sheet :   Rationale: increase ROM and increase strength to improve the patients ability to increase ease with ADLs    22 min Neuromuscular Re-education:  [x]  See flow sheet :   Rationale: increase strength, improve coordination and increase proprioception  to improve the patients ability to improve core strength to reduce low back pain and increase ease with functional activities    With   [x] TE   [] TA   [] neuro   [] other: Patient Education: [x] Review HEP    [] Progressed/Changed HEP based on:   [] positioning   [] body mechanics   [] transfers   [] heat/ice application    [] other:      Other Objective/Functional Measures:   Had significant difficulty with maria d disc exercises with feet off the floor  SB dead bugs cont to be challenging     Pain Level (0-10 scale) post treatment: 4/10    ASSESSMENT/Changes in Function:   Pt is slowly progressing toward goals. Pt cont with mild pain in the low back. Pt reported that work duties and his mattress aggravate his back. Cont with weakness in core. Cont with decreased sitting tolerance.      Patient will continue to benefit from skilled PT services to modify and progress therapeutic interventions, address functional mobility deficits, address ROM deficits, address strength deficits, analyze and cue movement patterns, analyze and modify body mechanics/ergonomics and instruct in home and community integration to attain remaining goals. []  See Plan of Care  [x]  See progress note/recertification  []  See Discharge Summary         Progress towards goals / Updated goals:  1. Patient will improve FOTO score by 16 points in order to demonstrate a significant improvement in function. 2. Patient will report pain at 2/10 or less during work in order to improve quality of life. 3. Patient will improve sitting tolerance to 30 minutes in order to increase ease of ADLs.    4. Patient will perform plank for 30 seconds with good form in order to demonstrate improving core stability for increased ease of working.     PLAN  []  Upgrade activities as tolerated     [x]  Continue plan of care  []  Update interventions per flow sheet       []  Discharge due to:_  []  Other:_      Mirian Temple PTA 9/9/2020  9:17 AM    Future Appointments   Date Time Provider Jerrod Clements   9/9/2020  4:30 PM El Paulino [facility]CPTPB [facility]   9/11/2020  4:30 PM Arnol Tavera IOSIEYJ [facility]   9/16/2020  4:30 PM Arnol Tavera IIMADNZ [facility]   9/18/2020  3:45 PM Tsering Heaps, PT [facility]   9/23/2020  4:30 PM Arnol Tavera GMPVPRM [facility]   9/25/2020  4:30 PM Tsering Heaps, PT [facility]CP[facility]   9/30/2020  4:30 PM Arnol Tavera KEDCRKS [facility]   10/2/2020  4:30 PM Tsering Heaps, PT [facility]CPTP[facility]

## 2020-09-11 ENCOUNTER — HOSPITAL ENCOUNTER (OUTPATIENT)
Dept: PHYSICAL THERAPY | Age: 25
Discharge: HOME | End: 2020-09-11
Payer: OTHER GOVERNMENT

## 2020-09-11 PROCEDURE — 97112 NEUROMUSCULAR REEDUCATION: CPT

## 2020-09-11 PROCEDURE — 97110 THERAPEUTIC EXERCISES: CPT

## 2020-09-11 NOTE — PROGRESS NOTES
PT DAILY TREATMENT NOTE 10-18    Patient Name: Mary Esteban  Date:2020  : 1995  [x]  Patient  Verified  Payor:  / Plan: [facility] REGION / Product Type:  /    In time: 4:30  Out time: 5:00  Total Treatment Time (min): 30  Visit #: 3 of 8    Treatment Area: Low back pain [M54.5]    SUBJECTIVE  Pain Level (0-10 scale): 4  Any medication changes, allergies to medications, adverse drug reactions, diagnosis change, or new procedure performed?: [x] No    [] Yes (see summary sheet for update)  Subjective functional status/changes:   [] No changes reported  \"I'm going to be honest, today I'm a little depressed. \"    OBJECTIVE     20 min Therapeutic Exercise:  [] See flow sheet :   Rationale: increase ROM and increase strength to improve the patients ability to perform ADLs with ease     10 min Neuromuscular Re-education:  []  See flow sheet :   Rationale: improve coordination, improve balance and increase proprioception  to improve the patients ability to stabilize, use proper body mechanics, and promote proper postural awareness          With   [x] TE   [] TA   [x] neuro   [] other: Patient Education: [x] Review HEP    [] Progressed/Changed HEP based on:   [x] positioning   [x] body mechanics   [] transfers   [] heat/ice application    [] other:      Other Objective/Functional Measures:   Increased reps on most supine exercises as tolerated     Pain Level (0-10 scale) post treatment: 3-4    ASSESSMENT/Changes in Function: Pt is progressing towards functional goals of tolerating work duties with decreased pain as he reports typically only experiencing pain if he has slept for longer times as the mattress causes a lot of discomfort. Sitting tolerance is improved as well, with the exception of sitting in his vehicle for the trip home due to unlevel surfaces he travels through.      Patient will continue to benefit from skilled PT services to modify and progress therapeutic interventions, address functional mobility deficits, address ROM deficits, address strength deficits, analyze and address soft tissue restrictions, analyze and cue movement patterns, analyze and modify body mechanics/ergonomics and assess and modify postural abnormalities to attain remaining goals. []  See Plan of Care  [x]  See progress note/recertification  []  See Discharge Summary         Progress towards goals / Updated goals:  1. Patient will improve FOTO score by 16 points in order to demonstrate a significant improvement in function. 2. Patient will report pain at 2/10 or less during work in order to improve quality of life. Current: not really a problem at work anymore; the issue is driving home from work and tops out at about a 4 - 9/11/20  3. Patient will improve sitting tolerance to 30 minutes in order to increase ease of ADLs. Current: MET 9/11/20; has a better work chair   4.  Patient will perform plank for 30 seconds with good form in order to demonstrate improving core stability for increased ease of working.     PLAN  [x]  Upgrade activities as tolerated     [x]  Continue plan of care  []  Update interventions per flow sheet       []  Discharge due to:_  []  Other:_      PAMELA Mazariegos 9/11/2020  4:32 PM    Future Appointments   Date Time Provider Jerrod Clements   9/16/2020  4:30 PM Ruby Humberto CORREA [facility]   9/18/2020  3:45 PM Simmie Pointer, PT [facility]   9/23/2020  4:30 PM Ruby Frievelio MMCPTPB [facility]   9/25/2020  4:30 PM Simmie Pointer, PT [facility]CPTPB [facility]   9/30/2020  4:30 PM Ruby Humberto RMKIQNM [facility]   10/2/2020  4:30 PM Simmie Pointer, PT MMCPTPB [facility]

## 2020-09-16 ENCOUNTER — APPOINTMENT (OUTPATIENT)
Dept: PHYSICAL THERAPY | Age: 25
End: 2020-09-16
Payer: OTHER GOVERNMENT

## 2020-09-18 ENCOUNTER — HOSPITAL ENCOUNTER (OUTPATIENT)
Dept: PHYSICAL THERAPY | Age: 25
Discharge: HOME | End: 2020-09-18
Payer: OTHER GOVERNMENT

## 2020-09-23 ENCOUNTER — HOSPITAL ENCOUNTER (OUTPATIENT)
Dept: PHYSICAL THERAPY | Age: 25
Discharge: HOME | End: 2020-09-23
Payer: OTHER GOVERNMENT

## 2020-09-25 ENCOUNTER — HOSPITAL ENCOUNTER (OUTPATIENT)
Dept: PHYSICAL THERAPY | Age: 25
Discharge: HOME | End: 2020-09-25
Payer: OTHER GOVERNMENT

## 2020-09-25 PROCEDURE — 97110 THERAPEUTIC EXERCISES: CPT

## 2020-09-25 PROCEDURE — 97112 NEUROMUSCULAR REEDUCATION: CPT

## 2020-09-25 NOTE — PROGRESS NOTES
PT DAILY TREATMENT NOTE 10-18    Patient Name: Monty Balbuena  Date:2020  : 1995  [x]  Patient  Verified  Payor:  / Plan: [employer]oss [employer] / Product Type:  /    In time: 4:27  Out time: 5:02  Total Treatment Time (min): 35  Visit #: 4 of 8    Treatment Area: Low back pain [M54.5]    SUBJECTIVE  Pain Level (0-10 scale): 4-5/10  Any medication changes, allergies to medications, adverse drug reactions, diagnosis change, or new procedure performed?: [x] No    [] Yes (see summary sheet for update)  Subjective functional status/changes:   [] No changes reported  Pt. Reports he is having more pain today from just getting off work. OBJECTIVE    10 min Therapeutic Exercise:  [x] See flow sheet :   Rationale: increase ROM and increase strength to improve the patients ability to increase ease of ADLs     25 min Neuromuscular Re-education:  [x]  See flow sheet : PPT exercises, maria d disc exercises    Rationale: increase strength, improve coordination and improve balance  to improve the patients ability to increase ease of ADLs          With   [x] TE   [] TA   [] neuro   [] other: Patient Education: [x] Review HEP    [] Progressed/Changed HEP based on:   [] positioning   [] body mechanics   [] transfers   [] heat/ice application    [] other:      Other Objective/Functional Measures:   Plank: Pt. Was able to maintain a plank for 30 seconds today. Pt. Was challenged with LE lift during pallof press  He required some cues to decreased arching his back during D2 flexion as he fatigued    Pain Level (0-10 scale) post treatment: 3/10    ASSESSMENT/Changes in Function:  Pt. Is progressing slowly towards goals. He continues to have fluctuating pain symptoms and increased pain with working. He has improving PPT control in supine but continues to be challenged with maintaining trunk control during dynamic movements.      Patient will continue to benefit from skilled PT services to modify and progress therapeutic interventions, address functional mobility deficits, address ROM deficits, address strength deficits, analyze and address soft tissue restrictions, analyze and cue movement patterns, analyze and modify body mechanics/ergonomics and assess and modify postural abnormalities to attain remaining goals. Progress towards goals / Updated goals:  *1. Patient will improve FOTO score by 16 points in order to demonstrate a significant improvement in function. 2. Patient will report pain at 2/10 or less during work in order to improve quality of life. Current: not really a problem at work anymore; the issue is driving home from work and tops out at about a 4 - 9/11/20  3. Patient will improve sitting tolerance to 30 minutes in order to increase ease of ADLs. Current: MET 9/11/20; has a better work chair   4.  Patient will perform plank for 30 seconds with good form in order to demonstrate improving core stability for increased ease of working.    met (9/25/20)    PLAN  []  Upgrade activities as tolerated     [x]  Continue plan of care  []  Update interventions per flow sheet       []  Discharge due to:_  []  Other:_      Sarah Balderrama, MAXWELL 9/25/2020  8:09 AM    Future Appointments   Date Time Provider Jerrod Clements   9/25/2020  4:30 PM Filiberto Acosta, PT [facility]CPTPB [facility]   9/30/2020  4:30 PM Lea MALAVE [facility]   10/2/2020  4:30 PM Filiberto Acosta, PT MMCPTPB [facility]

## 2020-09-30 ENCOUNTER — APPOINTMENT (OUTPATIENT)
Dept: PHYSICAL THERAPY | Age: 25
End: 2020-09-30
Payer: OTHER GOVERNMENT

## 2020-10-02 ENCOUNTER — HOSPITAL ENCOUNTER (OUTPATIENT)
Dept: PHYSICAL THERAPY | Age: 25
Discharge: HOME | End: 2020-10-02
Payer: OTHER GOVERNMENT

## 2020-10-02 PROCEDURE — 97140 MANUAL THERAPY 1/> REGIONS: CPT

## 2020-10-02 PROCEDURE — 97530 THERAPEUTIC ACTIVITIES: CPT

## 2020-10-02 PROCEDURE — 97110 THERAPEUTIC EXERCISES: CPT

## 2020-10-02 NOTE — PROGRESS NOTES
[facility]  [address]  [phone] fax    Physical Therapy Progress Note  Patient name: eMgan Saul Start of Care: 20   Referral source: Gilford Dacosta, MD : 1995   Medical/Treatment Diagnosis: Low back pain [M54.5]  Payor:  / Plan: Wali Goss 74 / Product Type:  /  Onset Date:About 8 months ago     Prior Hospitalization: see medical history Provider#: [id number]   Medications: Verified on Patient Summary List    Comorbidities: alcohol and tobacco use   Prior Level of Function: ind with all mobility, perspecta, sit for a long time due to job duty              Visits from Start of Care: 11    Missed Visits: 5    Established Goals:       *1. Patient will improve FOTO score by 16 points in order to demonstrate a significant improvement in function. Current: progressing, improved by 2 pts  2. Patient will report pain at 2/10 or less during work in order to improve quality of life. Current: not met, 4/10 at work    3. Patient will improve sitting tolerance to 30 minutes in order to increase ease of ADLs.  Current: MET 20; has a better work chair   4. Patient will perform plank for 30 seconds with good form in order to demonstrate improving core stability for increased ease of working.    met (20)    Key Functional Changes:   Patient has attended therapy for 11 sessions for the treatment of low back pain. At this time, the patient's progress has been fair. He has made improvements in his sitting tolerance at work as well as his overall pain levels since starting therapy. He also demonstrates improved abdominal endurance during plank interventions. However, his pain levels continue to fluctuate with work activites and he demonstrates decreased abdominal endurance during abdominal flexion testing.  The patient will benefit from continued skilled outpatient therapy to address his remaining functional limitations. Updated Goals: to be achieved in 4 weeks:  1. Patient will improve FOTO score by 14 points in order to demonstrate a significant improvement in function. 2. Patient will report pain at 2/10 or less during work in order to improve quality of life. 3. Patient will perform abdominal flexion test for 75 seconds with good form in order to demonstrate improving core stability for increased ease of working.         ASSESSMENT/RECOMMENDATIONS:  []Continue therapy per initial plan/protocol at a frequency of  2 x per week for 4 weeks  []Continue therapy with the following recommended changes:_____________________      _____________________________________________________________________  []Discontinue therapy progressing towards or have reached established goals  []Discontinue therapy due to lack of appreciable progress towards goals  []Discontinue therapy due to lack of attendance or compliance  []Await Physician's recommendations/decisions regarding therapy  []Other:________________________________________________________________    Thank you for this referral.   Jose Meeks 10/2/2020 6:18 PM    NOTE TO PHYSICIAN:  PLEASE COMPLETE THE ORDERS BELOW AND   FAX TO [facility] [place] [facility]: [phone]  If you are unable to process this request in 24 hours please contact our office: [phone] I have read the above report and request that my patient continue as recommended. ? I have read the above report and request that my patient continue therapy with the following changes/special instructions:____________________________________  ? I have read the above report and request that my patient be discharged from therapy.     Physicians signature: ______________________________Date: ______Time:______

## 2020-10-02 NOTE — PROGRESS NOTES
PT DAILY TREATMENT NOTE 10-18    Patient Name: Rafia Munguia  Date:10/2/2020  : 1995  [x]  Patient  Verified  Payor:  / Plan: [facility] REGION / Product Type:  /    In time:4:30  Out time:5:14  Total Treatment Time (min): 44  Visit #: 5 of 8    Medicare/BCBS Only   Total Timed Codes (min):   1:1 Treatment Time:         Treatment Area: Low back pain [M54.5]    SUBJECTIVE  Pain Level (0-10 scale): 5  Any medication changes, allergies to medications, adverse drug reactions, diagnosis change, or new procedure performed?: [x] No    [] Yes (see summary sheet for update)  Subjective functional status/changes:   [] No changes reported  \"It feels like my progress has slowed in the past month or so\"    OBJECTIVE    10 min Therapeutic Exercise:  [x] See flow sheet :   Rationale: increase ROM and increase strength to improve LE strength/mobility and  lumbar mobility to improve ease of ADLs and gait. 19 min Therapeutic Activity:  [x]  See flow sheet :   Pt education: FOTO, progress toward goals, updated SKTC stretch with PPT for home     15 min Manual Therapy:  Grade III unilateral PA mobs to lumbar spine; sidelying lumbar rotation mobilizations/stretching with extended overpressure; TrP release to right Q/L   Rationale: decrease pain, increase tissue extensibility and decrease trigger points to improve ease of standing tasks.             With   [] TE   [] TA   [] neuro   [] other: Patient Education: [x] Review HEP    [] Progressed/Changed HEP based on:   [] positioning   [] body mechanics   [] transfers   [] heat/ice application    [] other:      Other Objective/Functional Measures:   Functional Gains: improved ease of work tasks since getting lumbar support, not as much pain in left hip pa  Functional Deficits: back continues to feel very stiff which affects daily tasks,       Pain Level (0-10 scale) post treatment: 3.5    ASSESSMENT/Changes in Function: Patient has attended therapy for 11 sessions for the treatment of low back pain. At this time, the patient's progress has been fair. He has made improvements in his sitting tolerance at work as well as his overall pain levels since starting therapy. He also demonstrates improved abdominal endurance during plank interventions. However, his pain levels continue to fluctuate with work activites and he demonstrates decreased abdominal endurance during abdominal flexion testing. The patient will benefit from continued skilled outpatient therapy to address his remaining functional limitations. Patient will continue to benefit from skilled PT services to modify and progress therapeutic interventions, address functional mobility deficits, address ROM deficits, address strength deficits, analyze and address soft tissue restrictions, analyze and cue movement patterns, analyze and modify body mechanics/ergonomics, assess and modify postural abnormalities and address imbalance/dizziness to attain remaining goals. []  See Plan of Care  [x]  See progress note/recertification  []  See Discharge Summary         Progress towards goals / Updated goals:  *1. Patient will improve FOTO score by 16 points in order to demonstrate a significant improvement in function. Current: progressing, improved by 2 pts  2. Patient will report pain at 2/10 or less during work in order to improve quality of life. Current: not met, 4/10 at [facility] RAH: not really a problem at work anymore; the issue is driving home from work and tops out at about a 4 - 9/11/20  3. Patient will improve sitting tolerance to 30 minutes in order to increase ease of ADLs.  Current: MET 9/11/20; has a better work chair   4.  Patient will perform plank for 30 seconds with good form in order to demonstrate improving core stability for increased ease of working.    met (9/25/20)    PLAN  [x]  Upgrade activities as tolerated     [x]  Continue plan of care  []  Update interventions per flow sheet []  Discharge due to:_  []  Other:_      Bk Lau 10/2/2020  4:35 PM    No future appointments.

## 2020-10-07 ENCOUNTER — TRANSCRIBE ORDER (OUTPATIENT)
Dept: SCHEDULING | Age: 25
End: 2020-10-07

## 2020-10-07 DIAGNOSIS — M54.9 BACK PAIN: Primary | ICD-10-CM

## 2020-10-13 ENCOUNTER — HOSPITAL ENCOUNTER (OUTPATIENT)
Dept: PHYSICAL THERAPY | Age: 25
Discharge: HOME | End: 2020-10-13
Payer: OTHER GOVERNMENT

## 2020-10-13 PROCEDURE — 97530 THERAPEUTIC ACTIVITIES: CPT

## 2020-10-13 PROCEDURE — 97110 THERAPEUTIC EXERCISES: CPT

## 2020-10-13 NOTE — PROGRESS NOTES
PT DAILY TREATMENT NOTE 10-18    Patient Name: Holli Vázquez  Date:10/13/2020  : 1995  [x]  Patient  Verified  Payor:  / Plan: [facility]  [facility] REGION / Product Type:  /    In time:815  Out time:850  Total Treatment Time (min): 35  Visit #: 1 of 8    Treatment Area: Low back pain [M54.5]    SUBJECTIVE  Pain Level (0-10 scale): 2-3/10  Any medication changes, allergies to medications, adverse drug reactions, diagnosis change, or new procedure performed?: [x] No    [] Yes (see summary sheet for update)  Subjective functional status/changes:   [] No changes reported  Pt stated that his pain is not too bad today. OBJECTIVE    25 min Therapeutic Exercise:  [x] See flow sheet :   Rationale: increase ROM and increase strength to improve the patients ability to increase ease with ADLs    10 min Therapeutic Activity:  [x]  See flow sheet :   Rationale: increase strength, improve coordination and increase proprioception  to improve the patients ability to increase core strength and improve postural awareness           With   [x] TE   [] TA   [] neuro   [] other: Patient Education: [x] Review HEP    [] Progressed/Changed HEP based on:   [] positioning   [] body mechanics   [] transfers   [] heat/ice application    [] other:      Other Objective/Functional Measures:   Had slight increase in pain with SB dead bugs and KZ pallof press with LE lifts   SB brace rocking is very difficult  Cont to be challenged with SB dead bugs    Pain Level (0-10 scale) post treatment: 3/10    ASSESSMENT/Changes in Function:   Pt is making slow progress toward goals. Pt cont with inconsistent reported pain levels. Core strength is slowly improving.  Pt is able to perform most exercises with core bracing without cueing    Patient will continue to benefit from skilled PT services to modify and progress therapeutic interventions, address functional mobility deficits, address ROM deficits, address strength deficits, analyze and cue movement patterns, analyze and modify body mechanics/ergonomics, assess and modify postural abnormalities and instruct in home and community integration to attain remaining goals. []  See Plan of Care  [x]  See progress note/recertification  []  See Discharge Summary         Progress towards goals / Updated goals:  1. Patient will improve FOTO score by 14 points in order to demonstrate a significant improvement in function.   2. Patient will report pain at 2/10 or less during work in order to improve quality of life.   3. Patient will perform abdominal flexion test for 75 seconds with good form in order to demonstrate improving core stability for increased ease of working.     PLAN  []  Upgrade activities as tolerated     [x]  Continue plan of care  []  Update interventions per flow sheet       []  Discharge due to:_  []  Other:_      Stefan Marinelli PTA 10/13/2020  7:13 AM    Future Appointments   Date Time Provider Jerrod Clements   10/13/2020  8:15 AM Chau Sin, PTA MMCPTPB [facility]   10/16/2020  4:30 PM Leopoldo Pippin, PT [id number] [facility]   10/19/2020  5:15 PM Acosta Maher [id number] [facility]   10/21/2020  4:30 PM Leopoldo Pippin, PT MMCPTPB [facility]   10/26/2020  7:30 AM Chau Sin, PTA [facility]CPTP[facility]   10/29/2020  8:15 AM Leopoldo Pippin, PT CQRCTGU [facility]   10/29/2020  1:15 PM HBV MRI RM 1 HBVRMRI [facility]   11/2/2020  8:15 AM Chau Sin, PTA [facility]CPTP[facility]   11/3/2020  8:15 AM Chau Sin, PTA [facility]CPTP[facility]

## 2020-10-19 ENCOUNTER — HOSPITAL ENCOUNTER (OUTPATIENT)
Dept: PHYSICAL THERAPY | Age: 25
Discharge: HOME | End: 2020-10-19
Payer: OTHER GOVERNMENT

## 2020-10-19 PROCEDURE — 97530 THERAPEUTIC ACTIVITIES: CPT

## 2020-10-19 PROCEDURE — 97140 MANUAL THERAPY 1/> REGIONS: CPT

## 2020-10-19 PROCEDURE — 97110 THERAPEUTIC EXERCISES: CPT

## 2020-10-19 NOTE — PROGRESS NOTES
PT DAILY TREATMENT NOTE 10-18    Patient Name: Anton Reveal  Date:10/19/2020  : 1995  [x]  Patient  Verified  Payor:  / Plan: [facility]  [facility] REGION / Product Type:  /    In time: 5:19  Out time: 6:05  Total Treatment Time (min): 46  Visit #: 2 of 8    Treatment Area: Low back pain [M54.5]    SUBJECTIVE  Pain Level (0-10 scale): 3  Any medication changes, allergies to medications, adverse drug reactions, diagnosis change, or new procedure performed?: [x] No    [] Yes (see summary sheet for update)  Subjective functional status/changes:   [] No changes reported  \"They made me  formation for 2 hours straight yesterday and an hour on Sat; I was miserable. \"    OBJECTIVE    20 min Therapeutic Exercise:  [x] See flow sheet :   Rationale: increase ROM and increase strength to improve the patients ability to perform ADLs with ease    16 min Therapeutic Activity:  [x]  See flow sheet :   Rationale: increase strength, improve coordination and increase proprioception  to improve the patients ability to  perform daily tasks and return to PLOF    10 min Manual Therapy:  TPR to bilateral t/s paraspinals; TPR/DTM to left QL   Rationale: decrease pain, increase ROM and decrease trigger points to to increase mobility and reduce restriction with ADLs          With   [x] TE   [x] TA   [] neuro   [] other: Patient Education: [x] Review HEP    [] Progressed/Changed HEP based on:   [x] positioning   [x] body mechanics   [] transfers   [] heat/ice application    [] other:      Other Objective/Functional Measures:   Treadmill to test ambulation tolerance; pt reports intermittent \"waves\" of pain or discomfort in the left lower t/s paraspinals  Modified therex to address thoracic pain and tightness  Poor form with swords at [employer] today, reduced weight and continued with verbal cuing as needed    Pain Level (0-10 scale) post treatment: 3    ASSESSMENT/Changes in Function: Patient is presenting with minimal improvement with pain management and continues to c/o increased pain with prolonged standing and work activity. He tolerated modified and newly implemented exercises well with little to no increase in s/s, however will reassess results of manual at next visit. Patient will continue to benefit from skilled PT services to modify and progress therapeutic interventions, address functional mobility deficits, address ROM deficits, address strength deficits, analyze and address soft tissue restrictions, analyze and cue movement patterns, analyze and modify body mechanics/ergonomics and assess and modify postural abnormalities to attain remaining goals. []  See Plan of Care  [x]  See progress note/recertification  []  See Discharge Summary         Progress towards goals / Updated goals:  1. Patient will improve FOTO score by 14 points in order to demonstrate a significant improvement in function.   2. Patient will report pain at 2/10 or less during work in order to improve quality of life.   3. Patient will perform abdominal flexion test for 75 seconds with good form in order to demonstrate improving core stability for increased ease of working.        PLAN  [x]  Upgrade activities as tolerated     [x]  Continue plan of care  []  Update interventions per flow sheet       []  Discharge due to:_  []  Other:_      PAMELA Gallegos 10/19/2020  5:27 PM    Future Appointments   Date Time Provider Jerrod Clements   10/21/2020  4:30 PM Jessica De La Cruz, PT MMCPTPB [facility]   10/26/2020  7:30 AM Scott Jaime, PTA GVZAWPH [facility]   10/29/2020  8:15 AM Jessica De La Cruz PT MMCPTPB [facility]   10/29/2020  1:15 PM HBV MRI RM 1 HBVRMRI [facility]   11/2/2020  8:15 AM Scott Jaime, PTA [facility]CP[facility]   11/3/2020  8:15 AM Scott Jaime, PTA [facility]CPTP[facility]

## 2020-10-21 ENCOUNTER — HOSPITAL ENCOUNTER (OUTPATIENT)
Dept: PHYSICAL THERAPY | Age: 25
Discharge: HOME | End: 2020-10-21
Payer: OTHER GOVERNMENT

## 2020-10-21 PROCEDURE — 97140 MANUAL THERAPY 1/> REGIONS: CPT

## 2020-10-21 PROCEDURE — 97110 THERAPEUTIC EXERCISES: CPT

## 2020-10-21 PROCEDURE — 97112 NEUROMUSCULAR REEDUCATION: CPT

## 2020-10-21 NOTE — PROGRESS NOTES
PT DAILY TREATMENT NOTE 10-18    Patient Name: Tano Wylie  Date:10/21/2020  : 1995  [x]  Patient  Verified  Payor: [employer] / Plan: [facility] / Product Type:  /    In time: 4:33  Out time: 5:21  Total Treatment Time (min): 48  Visit #: 3 of 8    Treatment Area: Low back pain [M54.5]    SUBJECTIVE  Pain Level (0-10 scale): 2  Any medication changes, allergies to medications, adverse drug reactions, diagnosis change, or new procedure performed?: [x] No    [] Yes (see summary sheet for update)  Subjective functional status/changes:   [] No changes reported  Pt reports he did not work today so his pain is not bad. OBJECTIVE    18 min Therapeutic Exercise:  [x] See flow sheet :   Rationale: increase ROM and increase strength to improve the patients ability to perform ADLs with ease    15 min Neuromuscular Re-education:  [x]  See flow sheet : jose exercises, planks   Rationale: increase strength, improve coordination and increase proprioception  to improve the patients ability to tolerate ADLs     15 min Manual Therapy: in prone: grade 1-4 left transverse PA mobs, MFR left l/s paraspinals; in supine: pelvic alignment and leg length assessment, left long axis LE distraction for hip pain. Rationale: decrease pain, increase ROM and decrease trigger points to to increase mobility and reduce restriction with ADLs          With   [x] TE   [x] TA   [] neuro   [] other: Patient Education: [x] Review HEP    [] Progressed/Changed HEP based on:   [x] positioning   [x] body mechanics   [] transfers   [] heat/ice application    [] other:      Other Objective/Functional Measures: pelvic alignment WNL today. Added exercises per flow sheet to improve strength and ROM in the back. Increased tone in the left l/s paraspinals noted with manual. Limited left trunk rotation noted with quadriped t/s mobility exercises.      Pain Level (0-10 scale) post treatment: 2    ASSESSMENT/Changes in Function: Reported no changes in pain post session today. Pt continues to demonstrate soft tissue limitations on the left l/s and t/s region as noted with exercises and manual interventions. Educated pt that he can try the quadriped t/s rotation and thread the needle exericse at home to help with his ROM. He continues to having increased pain in the low back with work acitivites. Continue POC as tolerated. Patient will continue to benefit from skilled PT services to modify and progress therapeutic interventions, address functional mobility deficits, address ROM deficits, address strength deficits, analyze and address soft tissue restrictions, analyze and cue movement patterns, analyze and modify body mechanics/ergonomics and assess and modify postural abnormalities to attain remaining goals. []  See Plan of Care  []  See progress note/recertification  []  See Discharge Summary         Progress towards goals / Updated goals:  1. Patient will improve FOTO score by 14 points in order to demonstrate a significant improvement in function.   2. Patient will report pain at 2/10 or less during work in order to improve quality of life.   Current: 2/10 pain today 10/21/2020  3. Patient will perform abdominal flexion test for 75 seconds with good form in order to demonstrate improving core stability for increased ease of working.      PLAN  [x]  Upgrade activities as tolerated     [x]  Continue plan of care  [x]  Update interventions per flow sheet       []  Discharge due to:_  []  Other:_      Drenda Falling, PT  10/21/2020  4:38 PM    Future Appointments   Date Time Provider Jerrod Clements   10/26/2020  7:30 AM Carlos Lesches, PTA MMCPTPB [address]   10/29/2020  8:15 AM Heber Nielsen PT MMCPTPB [address]   10/29/2020  1:15 PM HBV MRI RM 1 HBVRMRI [facility]   11/2/2020  8:15 AM Carlos Lesches, PTA [facility]CPTP[facility] [address]   11/3/2020  8:15 AM Carlos Lesches, PTA MMCPTPB [address]     .

## 2020-10-29 ENCOUNTER — HOSPITAL ENCOUNTER (OUTPATIENT)
Age: 25
Discharge: HOME | End: 2020-10-29
Attending: FAMILY MEDICINE

## 2020-10-29 ENCOUNTER — HOSPITAL ENCOUNTER (OUTPATIENT)
Dept: PHYSICAL THERAPY | Age: 25
Discharge: HOME | End: 2020-10-29
Payer: OTHER GOVERNMENT

## 2020-10-29 DIAGNOSIS — M54.9 BACK PAIN: ICD-10-CM

## 2020-10-29 PROCEDURE — 97110 THERAPEUTIC EXERCISES: CPT

## 2020-10-29 PROCEDURE — 97530 THERAPEUTIC ACTIVITIES: CPT

## 2020-10-29 PROCEDURE — 72148 MRI LUMBAR SPINE W/O DYE: CPT

## 2020-10-29 NOTE — PROGRESS NOTES
[facility]  [address] [facility]  [phone] fax    Physical Therapy Progress Note  Patient name: Denise Kayser Start of Care: 20   Referral source: Anu Birmingham MD : 1995   Medical/Treatment Diagnosis: Low back pain [M54.5]  Payor:  / Plan: [facility] / Product Type:  /  Onset Date:About 8 months ago      Prior Hospitalization: see medical history Provider#: [id number]   Medications: Verified on Patient Summary List     Comorbidities: alcohol and tobacco use   Prior   Visits from Start of Care: 15    Missed Visits: 7    Established Goals:         Excellent           Good         Limited           None  [] Increased ROM   []  []  []  []  [] Increased Strength  []  []  []  []  [x] Increased Mobility  []  []  [x]  []   [x] Decreased Pain   []  []  [x]  []  [] Decreased Swelling  []  []  []  []    Key Functional Changes:   Functional Gains: pain is less intense overall, less burning into the B LEs/thighs  Functional Deficits: pain with work tasks, stiffness is still present   Pain         Best: 3/10 at work                Worst: 5/10 at work    Current goals:  1. Patient will improve FOTO score by 14 points in order to demonstrate a significant improvement in function.   Current: not met, 59 points total 10/29/2020  2. Patient will report pain at 2/10 or less during work in order to improve quality of life. Current: not met, 4-5/10 with using a chair with l/s support 10/29/2020  3. Patient will perform abdominal flexion test for 75 seconds with good form in order to demonstrate improving core stability for increased ease of working.   Current: not met, pt continues to have limited core stability with exercises and daily activities 10/29/2020    Updated Goals: to be achieved in 1 weeks:  Continue with unmet goals above.     ASSESSMENT/RECOMMENDATIONS:  Pt reports noticing overall slow improvements in pain and sitting tolerance since starting therapy. He continues to report having pain/stiffness in the low back with work tasks but states this pain is less intense overall with using the lumbar support. Pt is scheduled to have a MRI today and has 2 more therapy appointments scheduled. We will plan on having the pt attend these 2 future therapy appointments and have the pt follow up with the MD to review the MRI results. [x]Continue therapy per initial plan/protocol at a frequency of  2 x per week for 1 week then have pt follow up with MD regarding MRI results and recommendations regarding therapy. []Continue therapy with the following recommended changes:_____________________      _____________________________________________________________________  []Discontinue therapy progressing towards or have reached established goals  []Discontinue therapy due to lack of appreciable progress towards goals  []Discontinue therapy due to lack of attendance or compliance  [x]Await Physician's recommendations/decisions regarding therapy after next 2 scheduled therapy appointments  []Other:________________________________________________________________    Thank you for this referral.   Brittani Mark, PT 10/29/2020 8:59 AM    NOTE TO PHYSICIAN:  PLEASE COMPLETE THE ORDERS BELOW AND   FAX TO [facility]: [phone]  If you are unable to process this request in 24 hours please contact our office: [phone] I have read the above report and request that my patient continue as recommended. ? I have read the above report and request that my patient continue therapy with the following changes/special instructions:____________________________________  ? I have read the above report and request that my patient be discharged from therapy.     Physicians signature: ______________________________Date: ______Time:______

## 2020-10-29 NOTE — PROGRESS NOTES
PT DAILY TREATMENT NOTE 10-18    Patient Name: Patricia Moreno  Date:10/29/2020  : 1995  [x]  Patient  Verified  Payor:  / Plan: [facility]  [facility] REGION / Product Type:  /    In time: 8:18   Out time: 8:58  Total Treatment Time (min): 40  Visit #: 4 of 8    Treatment Area: Low back pain [M54.5]    SUBJECTIVE  Pain Level (0-10 scale): 2.5  Any medication changes, allergies to medications, adverse drug reactions, diagnosis change, or new procedure performed?: [x] No    [] Yes (see summary sheet for update)  Subjective functional status/changes:   [] No changes reported  Pt states he just woke up so his pain is not as bad. He reports that the QP thread the needle stretch does help with the tightness. OBJECTIVE    28 min Therapeutic Exercise:  [x] See flow sheet : stretching exercises, pt education on performing these stretching exercises at home and work to improve tightness and pain with work tasks and ADLs. Rationale: increase ROM and increase strength to improve the patients ability to perform ADLs. 12 min Therapeutic Activity:  []  See flow sheet : FOTO with pt, goal assessment   Rationale: increase ROM, increase strength and improve pain  to improve the patients ability to tolerate work activities. With   [x] TE   [x] TA   [] neuro   [] other: Patient Education: [x] Review HEP    [] Progressed/Changed HEP based on:   [x] positioning   [x] body mechanics   [] transfers   [] heat/ice application    [] other:      Other Objective/Functional Measures: See goals below. Functional Gains: pain is less intense overall, less burning into the B LEs/thighs  Functional Deficits: pain with work tasks, stiffness is still present   Pain     Best: 3/10 at work     Worst: 5/10 at work    Pain Level (0-10 scale) post treatment: 0    ASSESSMENT/Changes in Function: See PN. Reported improvement in tightness in his back post therapy today.  Pt reports noticing overall slow improvements in pain and sitting tolerance since starting therapy. He continues to report having pain/stiffness in the low back with work tasks but states this pain is less intense overall with using the lumbar support. Pt is scheduled to have a MRI today and has 2 more therapy appointments scheduled. We will plan on having the pt attend these 2 future therapy appointments and have the pt follow up with the MD to review the MRI results. Patient will continue to benefit from skilled PT services to modify and progress therapeutic interventions, address functional mobility deficits, address ROM deficits, address strength deficits, analyze and address soft tissue restrictions, analyze and cue movement patterns, analyze and modify body mechanics/ergonomics and assess and modify postural abnormalities to attain remaining goals. []  See Plan of Care  [x]  See progress note/recertification  []  See Discharge Summary         Progress towards goals / Updated goals:  1. Patient will improve FOTO score by 14 points in order to demonstrate a significant improvement in function.   Current: not met, 59 points total 10/29/2020  2. Patient will report pain at 2/10 or less during work in order to improve quality of life.   Current: 4-5/10 with using a chair with l/s support 10/29/2020  3. Patient will perform abdominal flexion test for 75 seconds with good form in order to demonstrate improving core stability for increased ease of working.   Current: not met, pt continues to have limited core stability with exercises and daily activities 10/29/2020     PLAN  [x]  Upgrade activities as tolerated     [x]  Continue plan of care  [x]  Update interventions per flow sheet       []  Discharge due to:_  []  Other:_      Mark Colon, PT  10/29/2020  8:36 AM    Future Appointments   Date Time Provider Jerrod Clements   10/29/2020  8:15 AM Gadiel Mendoza, PT [facility]CPTPB [facility]   10/29/2020  1:15 PM HBV MRI RM 1 HBVRMRI [facility]   11/2/2020  8:15 AM Brook Moore, PTA [facility]CPTP[facility]   11/3/2020  8:15 AM Sabino Connolly[facility]     .

## 2020-11-03 ENCOUNTER — APPOINTMENT (OUTPATIENT)
Dept: PHYSICAL THERAPY | Age: 25
End: 2020-11-03

## 2021-01-22 NOTE — PROGRESS NOTES
[facility] [address]  [address] [facility]  [phone] fax    Physical Therapy Discharge Summary    Patient name: Haroon Bobo Start of Care: 21   Referral source: Evette Shrestha MD : 1995   Medical/Treatment Diagnosis: Low back pain [M54.5]  Payor:  / Plan: Carolina Meyer / Product Type:  /  Onset Date:About 8 months ago      Prior Hospitalization: see medical history Provider#: [id number]   Medications: Verified on Patient Summary List     Comorbidities: alcohol and tobacco use   Prior   Visits from Start of Care: 15                                                Missed Visits: 7    Reporting Period : 10/29/20 to 10/29/20    Summary of Care:  Goal: Patient will improve FOTO score by 14 points in order to demonstrate a significant improvement in function.   Status at last note/certification: not met, 59 points total   Status at discharge: not met    Goal: Patient will report pain at 2/10 or less during work in order to improve quality of life. Status at last note/certification: not met, 4-5/10 with using a chair with l/s support   Status at discharge: not met    Goal: Patient will perform abdominal flexion test for 75 seconds with good form in order to demonstrate improving core stability for increased ease of working.   Status at last note/certification: not met, pt continues to have limited core stability with exercises and daily activities 10/29/2020   Status at discharge: not met    Pt. Did not return to PT after last progress note, so will be D/C at this time. Per last progress note \"Pt reports noticing overall slow improvements in pain and sitting tolerance since starting therapy. He continues to report having pain/stiffness in the low back with work tasks but states this pain is less intense overall with using the lumbar support.  Pt is scheduled to have a MRI today and has 2 more therapy appointments scheduled. We will plan on having the pt attend these 2 future therapy appointments and have the pt follow up with the MD to review the MRI results. \"    ASSESSMENT/RECOMMENDATIONS:  [x]Discontinue therapy: []Patient has reached or is progressing toward set goals      [x]Patient is non-compliant or has abdicated      []Due to lack of appreciable progress towards set goals    Bismark Swift, PT 1/22/2021 11:02 AM

## 2021-08-11 ENCOUNTER — DOCUMENTATION ONLY (OUTPATIENT)
Dept: ORTHOPEDIC SURGERY | Age: 26
End: 2021-08-11

## 2021-08-11 ENCOUNTER — OFFICE VISIT (OUTPATIENT)
Dept: ORTHOPEDIC SURGERY | Age: 26
End: 2021-08-11

## 2021-08-11 VITALS
WEIGHT: 214.8 LBS | BODY MASS INDEX: 31.81 KG/M2 | TEMPERATURE: 97.9 F | HEART RATE: 83 BPM | HEIGHT: 69 IN | OXYGEN SATURATION: 97 %

## 2021-08-11 DIAGNOSIS — G89.29 CHRONIC BILATERAL LOW BACK PAIN WITHOUT SCIATICA: Primary | ICD-10-CM

## 2021-08-11 DIAGNOSIS — M54.50 CHRONIC BILATERAL LOW BACK PAIN WITHOUT SCIATICA: Primary | ICD-10-CM

## 2021-08-11 DIAGNOSIS — M47.816 FACET ARTHROPATHY, LUMBAR: ICD-10-CM

## 2021-08-11 PROCEDURE — 99203 OFFICE O/P NEW LOW 30 MIN: CPT | Performed by: NURSE PRACTITIONER

## 2021-08-11 NOTE — PROGRESS NOTES
Lynnette Reeves NP ordered SNRB on patient he does not have any insurance I told him that he would need to pay $1000.00 up front for self pay. He said that he should be getting insurance in September or October and that he would call back. I told him that he would need to be seen or have a virtual visit before we could move forward with the injection.

## 2021-08-11 NOTE — PROGRESS NOTES
Sunshine Chaves presents today for   Chief Complaint   Patient presents with    Back Pain     Is someone accompanying this pt? No    Is the patient using any DME equipment during OV? No     Depression Screening:  No flowsheet data found. Coordination of Care:    1. Have you been to the ER, urgent care clinic since your last visit? Hospitalized since your last visit? No     2. Have you seen or consulted any other health care providers outside of the [address] [place] [address] since your last visit? Include any pap smears or colon screening. Yes, Family medicine in July.

## 2021-08-11 NOTE — PROGRESS NOTES
Chief complaint   Chief Complaint   Patient presents with    Back Pain       History of Present Illness:  Clarke Moore is a  23 y.o.  male comes in today as a new patient. He has chronic low back pain for the past 2 years. Initially started with injury where he dropped from a pull-up bar and landed on his feet. He did have pretty immediate pain in his back and right greater than left leg that radiated to about mid thigh. After about 3 to 4 months the leg pain did resolve. He states now he has a dull ache in his back and gets intermittent flares. When he has a flares he gets numbness in his back. His pain rating will be a 5-6 out of 10 when these flares occur and normally is about a 3 out of 10. He did go to physical therapy which did help at first and he continues to do the home exercise program but he does not really feel like it is helping much anymore when he has a flares of pain his takes ibuprofen 800 mg which and he will also use Lidoderm 5% patches along with ibuprofen. He did try going to a chiropractor at first but it actually made his pain worse. He has had thoracic and lumbar x-rays on 7/11/20 that demonstrated at L5-S1: Mild posterior disc bulge. No S1 root contact or central stenosis. Mildfacet hypertrophy. No foraminal stenosis. He then had a MRI lumbar spine on 10/29/20 MRI LS From L1-2 to L4-L5: Maintained normal discal height. No disc desiccation. No disc herniation, central or foraminal stenosis. L5-S1: Mild posterior disc bulge. No S1 root contact or central stenosis. Mild  facet hypertrophy. No foraminal stenosis. 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 He also works as a . He smokes 1 pack cigarettes per week. He denies fever bowel bladder dysfunction.   He would like to be able to get back to his normal [employer] duties without being on light duty.       Past Medical History: None    Past Surgical History: Right ORIF arm      Physical Exam: The patient is a 28-year-old male well-developed well-nourished who is alert and oriented with a normal mood and affect. He has a full weightbearing antalgic gait. He did not use any assist device. He has 5 out of 5 strength bilateral lower extremities. Negative straight leg raise. He does have pain with hyperextension lumbar spine. Pain is relieved with forward flexion. Assessment and Plan: This is a patient who had an injury back in December 2019 that gave him back pain and right greater than left leg pain. The leg pain did resolve on its own after few months. He continues with chronic back pain with intermittent flares. We will try a L5-S1 bilateral facet injection. Hopefully this will calm down his back enough where he can get back to his normal duties. We will see him back after the block. Medications: Ibuprofen 800 as needed Lidoderm patches as needed      Review of systems:    History reviewed. No pertinent past medical history. History reviewed. No pertinent surgical history. Social History     Socioeconomic History    Marital status: SINGLE     Spouse name: Not on file    Number of children: Not on file    Years of education: Not on file    Highest education level: Not on file   Occupational History    Not on file   Tobacco Use    Smoking status: Current Every Day Smoker     Packs/day: 0.25     Years: 3.00     Pack years: 0.75    Smokeless tobacco: Never Used   Vaping Use    Vaping Use: Never used   Substance and Sexual Activity    Alcohol use:  Yes     Alcohol/week: 1.0 standard drinks     Types: 1 Cans of beer per week    Drug use: Not on file    Sexual activity: Not on file   Other Topics Concern    Not on file   Social History Narrative    Not on file     Social Determinants of Health     Financial Resource Strain:     Difficulty of Paying Living Expenses:    Food Insecurity:     Worried About Running Out of Food in the Last Year:     [address] in the Last Year:    Transportation Needs:     Lack of Transportation (Medical):  Lack of Transportation (Non-Medical):    Physical Activity:     Days of Exercise per Week:     Minutes of Exercise per Session:    Stress:     Feeling of Stress :    Social Connections:     Frequency of Communication with Friends and Family:     Frequency of Social Gatherings with Friends and Family:     Attends [employer] Services:     Active Member of Clubs or Organizations:     Attends Club or Organization Meetings:     Marital Status:    Intimate Partner Violence:     Fear of Current or Ex-Partner:     Emotionally Abused:     Physically Abused:     Sexually Abused:      Family History   Problem Relation Age of Onset    Cancer Mother         lung    Cancer Maternal Grandmother        Physical Exam:  Visit Vitals  Pulse 83   Temp 97.9 °F (36.6 °C) (Temporal)   Ht 5' 9\" (1.753 m)   Wt 214 lb 12.8 oz (97.4 kg)   SpO2 97%   BMI 31.72 kg/m²     Pain Scale: 2/10    LPMP has been . reviewed and is appropriate        Diagnoses and all orders for this visit:    1. Chronic bilateral low back pain without sciatica  -     SCHEDULE SURGERY    2. Facet arthropathy, lumbar  -     SCHEDULE SURGERY            Follow-up and Dispositions    · Return for After block.              We have informed Sanyia Bobo to notify us for immediate appointment if he has any worsening neurogical symptoms or if an emergency situation presents, then call [phone]